# Patient Record
Sex: FEMALE | Race: WHITE | URBAN - METROPOLITAN AREA
[De-identification: names, ages, dates, MRNs, and addresses within clinical notes are randomized per-mention and may not be internally consistent; named-entity substitution may affect disease eponyms.]

---

## 2020-10-28 ENCOUNTER — APPOINTMENT (RX ONLY)
Dept: URBAN - METROPOLITAN AREA CLINIC 41 | Facility: CLINIC | Age: 80
Setting detail: DERMATOLOGY
End: 2020-10-28

## 2020-10-28 VITALS — TEMPERATURE: 96.9 F

## 2020-10-28 DIAGNOSIS — Z41.9 ENCOUNTER FOR PROCEDURE FOR PURPOSES OTHER THAN REMEDYING HEALTH STATE, UNSPECIFIED: ICD-10-CM

## 2020-10-28 PROCEDURE — ? PATIENT SPECIFIC COUNSELING

## 2021-04-27 ENCOUNTER — APPOINTMENT (RX ONLY)
Dept: URBAN - METROPOLITAN AREA CLINIC 41 | Facility: CLINIC | Age: 81
Setting detail: DERMATOLOGY
End: 2021-04-27

## 2021-04-27 DIAGNOSIS — Z41.9 ENCOUNTER FOR PROCEDURE FOR PURPOSES OTHER THAN REMEDYING HEALTH STATE, UNSPECIFIED: ICD-10-CM

## 2021-04-27 PROCEDURE — ? PATIENT SPECIFIC COUNSELING

## 2021-04-27 PROCEDURE — ? PRESCRIPTION

## 2021-04-27 RX ORDER — HYDROCODONE BITARTRATE AND ACETAMINOPHEN 5; 325 MG/1; MG/1
TABLET ORAL
Qty: 20 | Refills: 0 | COMMUNITY
Start: 2021-04-27

## 2021-04-27 RX ADMIN — HYDROCODONE BITARTRATE AND ACETAMINOPHEN: 5; 325 TABLET ORAL at 00:00

## 2021-04-27 ASSESSMENT — LOCATION DETAILED DESCRIPTION DERM
LOCATION DETAILED: RIGHT SUPERIOR LATERAL NECK
LOCATION DETAILED: RIGHT SUPERIOR ANTERIOR NECK

## 2021-04-27 ASSESSMENT — LOCATION ZONE DERM
LOCATION ZONE: NECK
LOCATION ZONE: NECK

## 2021-04-27 ASSESSMENT — LOCATION SIMPLE DESCRIPTION DERM
LOCATION SIMPLE: RIGHT ANTERIOR NECK
LOCATION SIMPLE: RIGHT ANTERIOR NECK

## 2021-05-06 ENCOUNTER — RX ONLY (OUTPATIENT)
Age: 81
Setting detail: RX ONLY
End: 2021-05-06

## 2021-05-06 RX ORDER — AZITHROMYCIN DIHYDRATE 250 MG/1
TABLET, FILM COATED ORAL
Qty: 6 | Refills: 0 | Status: ERX | COMMUNITY
Start: 2021-05-06

## 2021-05-11 ENCOUNTER — APPOINTMENT (RX ONLY)
Dept: URBAN - METROPOLITAN AREA CLINIC 41 | Facility: CLINIC | Age: 81
Setting detail: DERMATOLOGY
End: 2021-05-11

## 2021-05-11 VITALS
WEIGHT: 117 LBS | HEIGHT: 62 IN | OXYGEN SATURATION: 97 % | RESPIRATION RATE: 19 BRPM | SYSTOLIC BLOOD PRESSURE: 128 MMHG | HEART RATE: 92 BPM | DIASTOLIC BLOOD PRESSURE: 68 MMHG

## 2021-05-11 VITALS
SYSTOLIC BLOOD PRESSURE: 171 MMHG | HEART RATE: 86 BPM | WEIGHT: 117 LBS | HEIGHT: 62 IN | DIASTOLIC BLOOD PRESSURE: 101 MMHG

## 2021-05-11 DIAGNOSIS — Z41.9 ENCOUNTER FOR PROCEDURE FOR PURPOSES OTHER THAN REMEDYING HEALTH STATE, UNSPECIFIED: ICD-10-CM

## 2021-05-11 PROCEDURE — ? OTHER (COSMETIC)

## 2021-05-11 NOTE — PROCEDURE: OTHER (COSMETIC)
Price (Use Numbers Only, No Special Characters Or $): 1433
Detail Level: Zone
Other (Free Text): Cosmetic: loosened excess sagging skin at the neck\\n\\nPlan: Face Lift\\nPreoperative Diagnosis: Cosmetic\\nPostoperative Diagnosis: Cosmetic\\n\\nProcedure: Face Lift\\nSurgeon: Dr. Amos\\nAssistant: RAJEEV Carranza RN, MAYUR Gudino, RMA\\nAnesthesia: IV sedation, local anesthesia, and 1% lidocaine with epinephrine\\nVolume of Local Anesthesia: 20cc \\nEstimated Blood Loss: minimal\\nComplications: none\\n\\n\\nDescription of Procedure:\\nThe risks, benefits, expectations and alternatives of a face lift were discussed with the patient including, but not limited to, infection, bleeding, injury to nerves, blood vessels or other structures, delayed healing, visible scarring, contour irregularities, asymmetry, cosmetic dissatisfaction and unplanned return to the operating room. The patient read and signed the consent form and verbalized full understanding. The patient was brought to the operating room and the planned incision was drawn around the ear. The patient was placed in the standard supine position in the usual manner. After placement of monitors, anesthesia was achieved as above uneventfully. Surgical site preparation was performed with Hibiclens. The pre-auricualr retro-tragal incision was fashioned in the previously drawn line with a number 15 scalpel, carried posteriorly around the ear lobule to the post auricular sulcus. As necessary the incision was carried down the posterior hairline. A skin flap of the lateral cheek was raised superficial to the SMAS fascia around to the mastoid fascia posteriorly.The flap was dissected above the level of the SMAS until the limit of dissection was reached. Care was paid to avoidance of injury to the facial nerve and its branches, to the greater auricular and spinal accessory nerves. This sequence was conducted first on the right side an then the left. Using multiple 2-0 Ethibond sutures, the SMAS fascia was plicated and anchored superiorly to the zygomatic lexus-osteum.\\n\\nThe skin was then redraped on both sides, creating a sharp, cervico-mental angle, while minimizing undue tension on the skin flaps. Key sutures were placed near the anterior hairline and the apex of the posterior hairline. A slit was fashioned for delivery of the ear lobule, being careful to avoid pixie ear deformity. The redundant skin was then carefully and precisely marked, and sharply resected to match the lexus auricular incision. . Hemostasis was achieved with electrocautery. Preauricular closure was performed with deep dermal sutures of 4-0 Vicryl. The preauricualar epidermal closure was preformed with staples and 4-0 Prolene. Postauricular closure was performed with deep dermal sutures of 4-0 Vicryl, 4-0 Prolene, and Staples. The skin throughout had good color and vascularity.  \\n\\nThe patient tolerated the procedure well without complications. She recovered well with out complications. She recovered from anesthesia and appropriate bandges and dressing were applied. She was discharged accompanied with a responsible adult.\\n\\nPostcare Instructions were given: Please follow the following instructions after your face lift. The patient should sleep in an upright position for the first 48 hours and avoid strenuous activity. Do not engage in lifting over 5 lbs. of weight. Refrain from aerobic exercises. Please call the office should you experience fevers (temperature over 101.5), chills, unusual swelling, severe pain, bleeding that does not stop with gentle pressure, foul smelling drainage, weakness of muscles of facial expression, nausea, vomiting, leg swelling, shortness of breath, chest pain or difficulty with urination.

## 2021-05-12 ENCOUNTER — APPOINTMENT (RX ONLY)
Dept: URBAN - METROPOLITAN AREA CLINIC 41 | Facility: CLINIC | Age: 81
Setting detail: DERMATOLOGY
End: 2021-05-12

## 2021-05-12 ENCOUNTER — RX ONLY (OUTPATIENT)
Age: 81
Setting detail: RX ONLY
End: 2021-05-12

## 2021-05-12 DIAGNOSIS — Z41.9 ENCOUNTER FOR PROCEDURE FOR PURPOSES OTHER THAN REMEDYING HEALTH STATE, UNSPECIFIED: ICD-10-CM

## 2021-05-12 PROCEDURE — 99024 POSTOP FOLLOW-UP VISIT: CPT

## 2021-05-12 PROCEDURE — ? COSMETIC FOLLOW-UP

## 2021-05-12 RX ORDER — LORAZEPAM 1 MG/1
TABLET ORAL
Qty: 5 | Refills: 0 | COMMUNITY
Start: 2021-05-12

## 2021-05-12 ASSESSMENT — LOCATION SIMPLE DESCRIPTION DERM: LOCATION SIMPLE: RIGHT CHEEK

## 2021-05-12 ASSESSMENT — LOCATION ZONE DERM: LOCATION ZONE: FACE

## 2021-05-12 ASSESSMENT — LOCATION DETAILED DESCRIPTION DERM: LOCATION DETAILED: RIGHT INFERIOR PREAURICULAR CHEEK

## 2021-05-12 NOTE — PROCEDURE: COSMETIC FOLLOW-UP
Detail Level: Detailed
Treatment Override (Free Text): face/neck lift
Side Effects Override (Free Text): pain, bleeding, bruising
Global Improvement: Good

## 2021-05-12 NOTE — HPI: COSMETIC FOLLOW UP
How Did You Tolerate The Procedure?: not well
What Condition Are We Treating?: 1 day
What Procedure Did We Perform At The Last Visit?: post-op facelift
What Was The Problem (Use Complete Sentences)?: Pain, unable to sleep

## 2021-05-18 ENCOUNTER — APPOINTMENT (RX ONLY)
Dept: URBAN - METROPOLITAN AREA CLINIC 41 | Facility: CLINIC | Age: 81
Setting detail: DERMATOLOGY
End: 2021-05-18

## 2021-05-18 DIAGNOSIS — Z48.02 ENCOUNTER FOR REMOVAL OF SUTURES: ICD-10-CM

## 2021-05-18 PROCEDURE — ? SUTURE REMOVAL (GLOBAL PERIOD)

## 2021-05-18 ASSESSMENT — LOCATION DETAILED DESCRIPTION DERM: LOCATION DETAILED: RIGHT CRUS OF HELIX

## 2021-05-18 ASSESSMENT — LOCATION ZONE DERM: LOCATION ZONE: EAR

## 2021-05-18 ASSESSMENT — LOCATION SIMPLE DESCRIPTION DERM: LOCATION SIMPLE: RIGHT EAR

## 2021-05-18 NOTE — PROCEDURE: SUTURE REMOVAL (GLOBAL PERIOD)
Body Location Override (Optional - Billing Will Still Be Based On Selected Body Map Location If Applicable): post face lift
Detail Level: Detailed
Add 98998 Cpt? (Important Note: In 2017 The Use Of 21086 Is Being Tracked By Cms To Determine Future Global Period Reimbursement For Global Periods): no

## 2021-06-24 ENCOUNTER — APPOINTMENT (RX ONLY)
Dept: URBAN - METROPOLITAN AREA CLINIC 41 | Facility: CLINIC | Age: 81
Setting detail: DERMATOLOGY
End: 2021-06-24

## 2021-06-24 DIAGNOSIS — Z48.02 ENCOUNTER FOR REMOVAL OF SUTURES: ICD-10-CM

## 2021-06-24 PROCEDURE — 99024 POSTOP FOLLOW-UP VISIT: CPT

## 2021-06-24 PROCEDURE — ? SUTURE REMOVAL (GLOBAL PERIOD)

## 2021-06-24 ASSESSMENT — LOCATION SIMPLE DESCRIPTION DERM: LOCATION SIMPLE: SCALP

## 2021-06-24 ASSESSMENT — LOCATION DETAILED DESCRIPTION DERM: LOCATION DETAILED: LEFT CENTRAL POSTAURICULAR SKIN

## 2021-06-24 ASSESSMENT — LOCATION ZONE DERM: LOCATION ZONE: SCALP

## 2021-06-24 NOTE — PROCEDURE: SUTURE REMOVAL (GLOBAL PERIOD)
Body Location Override (Optional - Billing Will Still Be Based On Selected Body Map Location If Applicable): 2 remaining sutures on the left post auricular
Detail Level: Detailed
Add 49555 Cpt? (Important Note: In 2017 The Use Of 04040 Is Being Tracked By Cms To Determine Future Global Period Reimbursement For Global Periods): yes